# Patient Record
Sex: FEMALE | Race: BLACK OR AFRICAN AMERICAN | NOT HISPANIC OR LATINO | Employment: FULL TIME | URBAN - METROPOLITAN AREA
[De-identification: names, ages, dates, MRNs, and addresses within clinical notes are randomized per-mention and may not be internally consistent; named-entity substitution may affect disease eponyms.]

---

## 2017-07-04 ENCOUNTER — HOSPITAL ENCOUNTER (EMERGENCY)
Facility: HOSPITAL | Age: 37
Discharge: HOME | End: 2017-07-04

## 2017-07-04 VITALS
OXYGEN SATURATION: 100 % | HEIGHT: 56 IN | SYSTOLIC BLOOD PRESSURE: 111 MMHG | DIASTOLIC BLOOD PRESSURE: 56 MMHG | WEIGHT: 150 LBS | HEART RATE: 83 BPM | RESPIRATION RATE: 18 BRPM | BODY MASS INDEX: 33.74 KG/M2 | TEMPERATURE: 98 F

## 2017-07-04 DIAGNOSIS — D64.9 ANEMIA, UNSPECIFIED TYPE: ICD-10-CM

## 2017-07-04 DIAGNOSIS — B96.89 BACTERIAL VAGINOSIS: Primary | ICD-10-CM

## 2017-07-04 DIAGNOSIS — N76.0 BACTERIAL VAGINOSIS: Primary | ICD-10-CM

## 2017-07-04 LAB
B-HCG UR QL: NEGATIVE
BACTERIA #/AREA URNS HPF: NORMAL /HPF
BACTERIA GENITAL QL WET PREP: ABNORMAL
BASOPHILS # BLD AUTO: 0.03 K/UL
BASOPHILS NFR BLD: 0.3 %
BILIRUB UR QL STRIP: NEGATIVE
CLARITY UR: CLEAR
CLUE CELLS VAG QL WET PREP: ABNORMAL
COLOR UR: YELLOW
CTP QC/QA: YES
DIFFERENTIAL METHOD: ABNORMAL
EOSINOPHIL # BLD AUTO: 0.3 K/UL
EOSINOPHIL NFR BLD: 2.8 %
ERYTHROCYTE [DISTWIDTH] IN BLOOD BY AUTOMATED COUNT: 19.5 %
FILAMENT FUNGI VAG WET PREP-#/AREA: ABNORMAL
GLUCOSE UR QL STRIP: NEGATIVE
HCT VFR BLD AUTO: 32.3 %
HGB BLD-MCNC: 9.4 G/DL
HGB UR QL STRIP: NEGATIVE
KETONES UR QL STRIP: NEGATIVE
LEUKOCYTE ESTERASE UR QL STRIP: NEGATIVE
LYMPHOCYTES # BLD AUTO: 2.5 K/UL
LYMPHOCYTES NFR BLD: 23.6 %
MCH RBC QN AUTO: 22.3 PG
MCHC RBC AUTO-ENTMCNC: 29.1 %
MCV RBC AUTO: 77 FL
MICROSCOPIC COMMENT: NORMAL
MONOCYTES # BLD AUTO: 0.7 K/UL
MONOCYTES NFR BLD: 6.9 %
NEUTROPHILS # BLD AUTO: 7.1 K/UL
NEUTROPHILS NFR BLD: 66.4 %
NITRITE UR QL STRIP: NEGATIVE
PH UR STRIP: 6 [PH] (ref 5–8)
PLATELET # BLD AUTO: 485 K/UL
PMV BLD AUTO: 9.4 FL
PROT UR QL STRIP: NEGATIVE
RBC # BLD AUTO: 4.22 M/UL
RBC #/AREA URNS HPF: 1 /HPF (ref 0–4)
SP GR UR STRIP: 1.02 (ref 1–1.03)
SPECIMEN SOURCE: ABNORMAL
SQUAMOUS #/AREA URNS HPF: 5 /HPF
T VAGINALIS GENITAL QL WET PREP: ABNORMAL
URN SPEC COLLECT METH UR: NORMAL
UROBILINOGEN UR STRIP-ACNC: NEGATIVE EU/DL
WBC # BLD AUTO: 10.71 K/UL
WBC #/AREA URNS HPF: 1 /HPF (ref 0–5)
WBC #/AREA VAG WET PREP: ABNORMAL
YEAST GENITAL QL WET PREP: ABNORMAL

## 2017-07-04 PROCEDURE — 87210 SMEAR WET MOUNT SALINE/INK: CPT

## 2017-07-04 PROCEDURE — 99284 EMERGENCY DEPT VISIT MOD MDM: CPT | Mod: 25

## 2017-07-04 PROCEDURE — 87591 N.GONORRHOEAE DNA AMP PROB: CPT

## 2017-07-04 PROCEDURE — 81025 URINE PREGNANCY TEST: CPT | Performed by: NURSE PRACTITIONER

## 2017-07-04 PROCEDURE — 81000 URINALYSIS NONAUTO W/SCOPE: CPT

## 2017-07-04 PROCEDURE — 85025 COMPLETE CBC W/AUTO DIFF WBC: CPT

## 2017-07-04 RX ORDER — METRONIDAZOLE 500 MG/1
500 TABLET ORAL 2 TIMES DAILY
Qty: 14 TABLET | Refills: 0 | Status: SHIPPED | OUTPATIENT
Start: 2017-07-04 | End: 2017-07-11

## 2017-07-04 NOTE — ED TRIAGE NOTES
C/o bone pain throughout began yesterday. The last time she felt this way she needed a blood transfusion 2 years.  Unknown reason for transfusion. Also, patient is c/o vaginal odor without discharge x 2 weeks.

## 2017-07-04 NOTE — ED PROVIDER NOTES
"Encounter Date: 7/4/2017    SCRIBE #1 NOTE: I, Jose Liu, am scribing for, and in the presence of,  Marge Villar PA-C. I have scribed the following portions of the note - Other sections scribed: HPI and ROS.       History     Chief Complaint   Patient presents with    Generalized Body Aches     all over body /bone aches since yesterday.     Vaginitis     "I have an odor" denies discharge or pain     CC: Generalized Body Aches and Vaginitis    HPI: This 37 y.o. 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 Pt reports that she is concerned that she may have to have a blood transfusion because she had to last time she felt like this in 2015. Pt also reports that she has had an odor in her vagina x2 weeks. Pt denies fever, nausea, vomiting, and diarrhea.      The history is provided by the patient. No  was used.     Review of patient's allergies indicates:  No Known Allergies  Past Medical History:   Diagnosis Date    Fibroid tumor     left breast     Past Surgical History:   Procedure Laterality Date    fibroid tumor removal Left 2002     History reviewed. No pertinent family history.  Social History   Substance Use Topics    Smoking status: Never Smoker    Smokeless tobacco: Never Used    Alcohol use No     Review of Systems   Constitutional: Negative for fever.   HENT: Negative for sore throat.    Respiratory: Negative for shortness of breath.    Cardiovascular: Negative for chest pain.   Gastrointestinal: Negative for nausea.        (+) vaginal odor   Genitourinary: Negative for dysuria.   Musculoskeletal: Negative for back pain.        (+) generalized body pain  (+) bilateral leg pain  (+) lower back pain   Skin: Negative for rash.   Neurological: Negative for weakness.   Hematological: Does not " bruise/bleed easily.       Physical Exam     Initial Vitals [07/04/17 1750]   BP Pulse Resp Temp SpO2   130/82 99 16 98.4 °F (36.9 °C) 97 %      MAP       98         Physical Exam    Nursing note and vitals reviewed.  Constitutional: She appears well-developed and well-nourished. She is not diaphoretic. No distress.   HENT:   Head: Normocephalic and atraumatic.   Right Ear: External ear normal.   Left Ear: External ear normal.   Nose: Nose normal.   Mouth/Throat: Oropharynx is clear and moist.   Eyes: EOM are normal. Pupils are equal, round, and reactive to light.   Neck: Normal range of motion. Neck supple.   Cardiovascular: Normal rate and regular rhythm.   No murmur heard.  Pulmonary/Chest: Breath sounds normal. No respiratory distress. She has no wheezes. She has no rhonchi. She has no rales.   Abdominal: Soft. Bowel sounds are normal. She exhibits no distension. There is no tenderness. There is no rebound and no guarding.   Genitourinary:   Genitourinary Comments: There is a white malodorous discharge noted in the vaginal vault.  There is no cervical medication tenderness or adnexal tenderness to palpation.  There are no external genital lesions noted.  The cervix is closed.   Musculoskeletal: Normal range of motion.   Neurological: She is alert and oriented to person, place, and time. No cranial nerve deficit or sensory deficit.   Skin: Skin is warm. Capillary refill takes less than 2 seconds. No rash noted. No erythema.         ED Course   Procedures  Labs Reviewed   VAGINAL SCREEN - Abnormal; Notable for the following:        Result Value    Clue Cells, Wet Prep Few (*)     WBC - Vaginal Screen Few (*)     Bacteria - Vaginal Screen Few (*)     All other components within normal limits   CBC W/ AUTO DIFFERENTIAL - Abnormal; Notable for the following:     Hemoglobin 9.4 (*)     Hematocrit 32.3 (*)     MCV 77 (*)     MCH 22.3 (*)     MCHC 29.1 (*)     RDW 19.5 (*)     Platelets 485 (*)     All other components  within normal limits   C. TRACHOMATIS/N. GONORRHOEAE BY AMP DNA   URINALYSIS   URINALYSIS MICROSCOPIC   POCT URINE PREGNANCY             Medical Decision Making:   Differential Diagnosis:   This is an urgent evaluation of a 37-year-old female who presents to the emergency department with 2 medical complaints.  The patient reports body aches.  She describes his body aches is extreme pain in her bilateral legs and hips.  She states that this pain is similar to when she needed a blood transfusion secondary to anemia.  She is also concerned that she has a vaginal infection due to an odor.    Previous medical records were obtained and reviewed.  This patient has a history of fibroid tumor and anemia.    The patient is currently afebrile and nontoxic in appearance.  Her vital signs are stable.  On physical exam, the patient is calm, cooperative, smiling and in no distress. She has a normal, steady gait. On physical exam, there is no abdominal tenderness that could be elicited.  There is no CVA tenderness noted.  There is no pelvic tenderness to palpation.  I carefully considered but doubt acute appendicitis, acute cholecystitis.  On pelvic exam, there is a slight odor noted to the vaginal vault.  There is a white milky discharge in the vaginal vault.  There are no external genital lesions noted.  There is no cervical motion or adnexal tenderness to palpation.  Urine pregnancy test was performed and was negative.  I carefully considered but doubt ectopic pregnancy, ovarian torsion, tubo-ovarian abscess.  Rapid urinalysis was performed which revealed no evidence of urinary tract infection.  Vaginal screen was also performed which revealed few clue cells, few white blood cells and few bacteria.  A CBC is remarkable for an H&H of 9.4 and 32.3.  This is around her baseline H&H.    I will treat this patient for bacterial vaginosis with Flagyl.  I carefully considered but doubt influenza, sepsis, meningitis, or pelvic  inflammatory disease at this time.  She'll need to follow-up with her primary care physician and OB/GYN.  She was instructed to continue taking over-the-counter iron as previously prescribed.  The patient verbalized understanding and agreement and the treatment plan and all questions were answered.  This patient was discussed with Dr. Reeves and she is in agreement with the assessment and treatment plan.            Scribe Attestation:   Scribe #1: I performed the above scribed service and the documentation accurately describes the services I performed. I attest to the accuracy of the note.    Attending Attestation:           Physician Attestation for Scribe:  Physician Attestation Statement for Scribe #1: I, Marge Villar PA-C, reviewed documentation, as scribed by Jose Liu in my presence, and it is both accurate and complete.                 ED Course     Clinical Impression:   The primary encounter diagnosis was Bacterial vaginosis. A diagnosis of Anemia, unspecified type was also pertinent to this visit.    Disposition:   Disposition: Discharged  Condition: Stable                        Marge Villar PA-C  07/04/17 2125       Marge Villar PA-C  07/04/17 2126

## 2017-07-05 LAB
C TRACH DNA SPEC QL NAA+PROBE: NOT DETECTED
N GONORRHOEA DNA SPEC QL NAA+PROBE: NOT DETECTED

## 2017-07-05 NOTE — DISCHARGE INSTRUCTIONS
Take medication as prescribed.  Follow-up with your doctor.  Do not put anything in the vagina until you have completed your antibiotics.  Return to the emergency department for any changing or concerning symptoms.  Please continue to take your iron as previously prescribed for anemia.

## 2018-02-15 ENCOUNTER — HOSPITAL ENCOUNTER (EMERGENCY)
Facility: HOSPITAL | Age: 38
Discharge: HOME | End: 2018-02-15
Attending: EMERGENCY MEDICINE

## 2018-02-15 VITALS
RESPIRATION RATE: 16 BRPM | DIASTOLIC BLOOD PRESSURE: 73 MMHG | TEMPERATURE: 99 F | SYSTOLIC BLOOD PRESSURE: 140 MMHG | BODY MASS INDEX: 30.24 KG/M2 | WEIGHT: 150 LBS | HEIGHT: 59 IN | HEART RATE: 100 BPM | OXYGEN SATURATION: 99 %
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
## 2018-02-15 DIAGNOSIS — T22.252A PARTIAL THICKNESS BURN OF LEFT SHOULDER, INITIAL ENCOUNTER: Primary | ICD-10-CM
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
## 2018-02-15 DIAGNOSIS — T30.0 BURN: ICD-10-CM

## 2018-02-15 DIAGNOSIS — S49.92XA INJURY OF SHOULDER, LEFT: ICD-10-CM

## 2018-02-15 PROCEDURE — 25000003 PHARM REV CODE 250: Performed by: PHYSICIAN ASSISTANT

## 2018-02-15 PROCEDURE — 90715 TDAP VACCINE 7 YRS/> IM: CPT | Performed by: PHYSICIAN ASSISTANT

## 2018-02-15 PROCEDURE — 99284 EMERGENCY DEPT VISIT MOD MDM: CPT | Mod: ,,, | Performed by: EMERGENCY MEDICINE

## 2018-02-15 PROCEDURE — 63600175 PHARM REV CODE 636 W HCPCS: Performed by: PHYSICIAN ASSISTANT

## 2018-02-15 PROCEDURE — 16020 DRESS/DEBRID P-THICK BURN S: CPT

## 2018-02-15 PROCEDURE — 90471 IMMUNIZATION ADMIN: CPT | Performed by: PHYSICIAN ASSISTANT

## 2018-02-15 PROCEDURE — 99283 EMERGENCY DEPT VISIT LOW MDM: CPT

## 2018-02-15 RX ORDER — BACITRACIN ZINC 500 UNIT/G
OINTMENT IN PACKET (EA) TOPICAL
Status: COMPLETED | OUTPATIENT
Start: 2018-02-15 | End: 2018-02-15

## 2018-02-15 RX ORDER — NAPROXEN 500 MG/1
500 TABLET ORAL 2 TIMES DAILY WITH MEALS
Qty: 30 TABLET | Refills: 0 | Status: SHIPPED | OUTPATIENT
Start: 2018-02-15

## 2018-02-15 RX ORDER — NAPROXEN 500 MG/1
500 TABLET ORAL
Status: COMPLETED | OUTPATIENT
Start: 2018-02-15 | End: 2018-02-15

## 2018-02-15 RX ADMIN — NAPROXEN 500 MG: 500 TABLET ORAL at 05:02

## 2018-02-15 RX ADMIN — BACITRACIN 1 EACH: 500 OINTMENT TOPICAL at 05:02

## 2018-02-15 RX ADMIN — CLOSTRIDIUM TETANI TOXOID ANTIGEN (FORMALDEHYDE INACTIVATED), CORYNEBACTERIUM DIPHTHERIAE TOXOID ANTIGEN (FORMALDEHYDE INACTIVATED), BORDETELLA PERTUSSIS TOXOID ANTIGEN (GLUTARALDEHYDE INACTIVATED), BORDETELLA PERTUSSIS FILAMENTOUS HEMAGGLUTININ ANTIGEN (FORMALDEHYDE INACTIVATED), BORDETELLA PERTUSSIS PERTACTIN ANTIGEN, AND BORDETELLA PERTUSSIS FIMBRIAE 2/3 ANTIGEN 0.5 ML: 5; 2; 2.5; 5; 3; 5 INJECTION, SUSPENSION INTRAMUSCULAR at 05:02
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
## 2020-03-25 ENCOUNTER — HOSPITAL ENCOUNTER (EMERGENCY)
Facility: HOSPITAL | Age: 40
Discharge: HOME | End: 2020-03-25
Attending: INTERNAL MEDICINE

## 2020-03-25 VITALS
RESPIRATION RATE: 16 BRPM | HEART RATE: 104 BPM | TEMPERATURE: 99 F | DIASTOLIC BLOOD PRESSURE: 66 MMHG | BODY MASS INDEX: 47.46 KG/M2 | WEIGHT: 235 LBS | SYSTOLIC BLOOD PRESSURE: 109 MMHG | OXYGEN SATURATION: 95 %

## 2020-03-25 DIAGNOSIS — A08.4 VIRAL GASTROENTERITIS: ICD-10-CM

## 2020-03-25 DIAGNOSIS — J06.9 ACUTE URI: Primary | ICD-10-CM

## 2020-03-25 PROCEDURE — 25000003 PHARM REV CODE 250: Mod: ER | Performed by: INTERNAL MEDICINE

## 2020-03-25 PROCEDURE — 99284 EMERGENCY DEPT VISIT MOD MDM: CPT | Mod: ER

## 2020-03-25 RX ORDER — ONDANSETRON 4 MG/1
4 TABLET, ORALLY DISINTEGRATING ORAL EVERY 12 HOURS PRN
Qty: 10 TABLET | Refills: 0 | Status: SHIPPED | OUTPATIENT
Start: 2020-03-25

## 2020-03-25 RX ORDER — ACETAMINOPHEN 500 MG
1000 TABLET ORAL
Status: COMPLETED | OUTPATIENT
Start: 2020-03-25 | End: 2020-03-25

## 2020-03-25 RX ORDER — ONDANSETRON 4 MG/1
8 TABLET, ORALLY DISINTEGRATING ORAL
Status: COMPLETED | OUTPATIENT
Start: 2020-03-25 | End: 2020-03-25

## 2020-03-25 RX ORDER — FLUTICASONE PROPIONATE 50 MCG
2 SPRAY, SUSPENSION (ML) NASAL DAILY
Qty: 15 G | Refills: 0 | Status: SHIPPED | OUTPATIENT
Start: 2020-03-25

## 2020-03-25 RX ORDER — AZELASTINE 1 MG/ML
2 SPRAY, METERED NASAL 2 TIMES DAILY
Qty: 30 ML | Refills: 0 | Status: SHIPPED | OUTPATIENT
Start: 2020-03-25 | End: 2020-05-19

## 2020-03-25 RX ADMIN — ONDANSETRON 8 MG: 4 TABLET, ORALLY DISINTEGRATING ORAL at 10:03

## 2020-03-25 RX ADMIN — ACETAMINOPHEN 1000 MG: 500 TABLET ORAL at 10:03

## 2020-03-25 NOTE — ED PROVIDER NOTES
"Encounter Date: 3/25/2020    SCRIBE #1 NOTE: I, Radha Hylton, am scribing for, and in the presence of,  Dr. Guthrie. I have scribed the following portions of the note - Other sections scribed: HPI, ROS, PE.       History     Chief Complaint   Patient presents with    URI     Pt states," Cough for one week. Vomiting for one week with cough."     Annamaria Cardona is a 40 y.o. female who presents to the ED complaining of vomiting x 1 week. Reports 2 episodes of vomiting today. Reports cough and intermittent fever x 5 days. Denies SOB and rhinorrhea. Did not take any nausea medication.     The history is provided by the patient. No  was used.     Review of patient's allergies indicates:   Allergen Reactions    Adhesive      Surgical tape     Past Medical History:   Diagnosis Date    Fibroid tumor     left breast     Past Surgical History:   Procedure Laterality Date    fibroid tumor removal Left 2002     History reviewed. No pertinent family history.  Social History     Tobacco Use    Smoking status: Never Smoker    Smokeless tobacco: Never Used   Substance Use Topics    Alcohol use: No    Drug use: No     Review of Systems   Constitutional: Positive for fever.   HENT: Negative for rhinorrhea.    Respiratory: Positive for cough. Negative for shortness of breath.    Gastrointestinal: Positive for vomiting.   All other systems reviewed and are negative.      Physical Exam     Initial Vitals [03/25/20 0928]   BP Pulse Resp Temp SpO2   120/68 (!) 115 16 (!) 101.2 °F (38.4 °C) 97 %      MAP       --         Physical Exam    Nursing note and vitals reviewed.  Constitutional: She appears well-developed and well-nourished. She is Obese .   HENT:   Head: Normocephalic and atraumatic.   Enlarged nasal turbinates noted. Clear nasal discharge noted. Oropharyngeal erythema present. No oropharyngeal exudate or edema.    Eyes: Conjunctivae are normal.   Neck: Normal range of motion. Neck supple. "   Cardiovascular: Normal rate, regular rhythm and normal heart sounds. Exam reveals no gallop and no friction rub.    No murmur heard.  Pulmonary/Chest: Breath sounds normal. No respiratory distress. She has no wheezes. She has no rhonchi. She has no rales.   Abdominal: Soft. There is no tenderness.   Musculoskeletal: Normal range of motion. She exhibits no edema.   Neurological: She is alert and oriented to person, place, and time. GCS score is 15. GCS eye subscore is 4. GCS verbal subscore is 5. GCS motor subscore is 6.   Skin: Skin is warm and dry.   Psychiatric: She has a normal mood and affect.         ED Course   Procedures  Labs Reviewed - No data to display       Imaging Results    None          Medical Decision Making:   History:   Old Medical Records: I decided to obtain old medical records.  Initial Assessment:   Annamaria Cardona is a 40 y.o. female who presents to the ED complaining of vomiting x 1 week. Reports 2 episodes of vomiting today. Reports cough and intermittent fever x 5 days. Denies SOB and rhinorrhea. Did not take any nausea medication.   ED Management:  Patient was given instructions for acute viral syndrome and received Zofran/Tylenol in the emergency department.  Prescriptions for Zofran/fluticasone/Astelin were given prior to discharge and the patient was advised to follow up with her primary care physician within the next 2 days for re-evaluation/return to the emergency department if condition worsens.            Scribe Attestation:   Scribe #1: I performed the above scribed service and the documentation accurately describes the services I performed. I attest to the accuracy of the note.    This document was produced by a scribe under my direction and in my presence. I agree with the content of the note and have made any necessary edits.     Dr. Guthrie    03/25/2020 10:59 AM                        Clinical Impression:     1. Acute URI    2. Viral gastroenteritis            Disposition:    Disposition: Discharged  Condition: Stable     ED Disposition Condition    Discharge Stable        ED Prescriptions     Medication Sig Dispense Start Date End Date Auth. Provider    ondansetron (ZOFRAN-ODT) 4 MG TbDL Take 1 tablet (4 mg total) by mouth every 12 (twelve) hours as needed. 10 tablet 3/25/2020  Hemant Guthrie MD    azelastine (ASTELIN) 137 mcg (0.1 %) nasal spray 2 sprays (274 mcg total) by Nasal route 2 (two) times daily. 30 mL 3/25/2020 5/19/2020 Hemant Guthrie MD    fluticasone propionate (FLONASE) 50 mcg/actuation nasal spray 2 sprays (100 mcg total) by Each Nostril route once daily. 15 g 3/25/2020  Hemant Guthrie MD        Follow-up Information    None                                    Hemant Guthrie MD  03/25/20 1100